# Patient Record
Sex: FEMALE | Race: OTHER | HISPANIC OR LATINO | Employment: UNEMPLOYED | URBAN - METROPOLITAN AREA
[De-identification: names, ages, dates, MRNs, and addresses within clinical notes are randomized per-mention and may not be internally consistent; named-entity substitution may affect disease eponyms.]

---

## 2018-03-06 ENCOUNTER — OFFICE VISIT (OUTPATIENT)
Dept: URGENT CARE | Facility: MEDICAL CENTER | Age: 23
End: 2018-03-06
Payer: COMMERCIAL

## 2018-03-06 VITALS
DIASTOLIC BLOOD PRESSURE: 68 MMHG | WEIGHT: 147 LBS | OXYGEN SATURATION: 99 % | HEART RATE: 90 BPM | HEIGHT: 61 IN | BODY MASS INDEX: 27.75 KG/M2 | RESPIRATION RATE: 18 BRPM | SYSTOLIC BLOOD PRESSURE: 106 MMHG | TEMPERATURE: 97.8 F

## 2018-03-06 DIAGNOSIS — Z02.4 DRIVER'S PERMIT PE (PHYSICAL EXAMINATION): Primary | ICD-10-CM

## 2018-03-06 RX ORDER — ALBUTEROL SULFATE 90 UG/1
2 AEROSOL, METERED RESPIRATORY (INHALATION) EVERY 6 HOURS PRN
COMMUNITY

## 2018-03-06 NOTE — PROGRESS NOTES
[facility]        NAME: Carole Cuevas is a 20 y o  female  : 1995    MRN: [id number]  DATE: 2018  TIME: 11:13 AM    Assessment and Plan   's permit PE (physical examination) [Z02 4]  1  's permit PE (physical examination)       Patient Instructions     Patient deemed fit to operate a motor vehicle  Paperwork signed, copied, original given back to patient  Patient Instructions     Follow up with PCP in 3-5 days  Proceed to  ER if symptoms worsen  Chief Complaint     Chief Complaint   Patient presents with    Annual Exam     drivers permit physical         History of Present Illness   Shaina Jaquez presents to the clinic c/o      71-year-old female presents for learner permit physical examination  No acute complaints or concerns at this time  Review of Systems   Review of Systems   Constitutional: Negative  HENT: Negative  Eyes: Negative  Respiratory: Negative  Cardiovascular: Negative  Gastrointestinal: Negative  Current Medications     No long-term prescriptions on file  Current Allergies     Allergies as of 2018    (No Known Allergies)            The following portions of the patient's history were reviewed and updated as appropriate: allergies, current medications, past family history, past medical history, past social history, past surgical history and problem list     Objective   /68 (BP Location: Left arm, Patient Position: Sitting, Cuff Size: Adult)   Pulse 90   Temp 97 8 °F (36 6 °C)   Resp 18   Ht 5' 1" (1 549 m)   Wt 66 7 kg (147 lb)   LMP 2018   SpO2 99%   BMI 27 78 kg/m²        Physical Exam     Physical Exam   Constitutional: She is oriented to person, place, and time  She appears well-developed and well-nourished  HENT:   Head: Normocephalic and atraumatic     Right Ear: External ear normal    Left Ear: External ear normal    Nose: Nose normal    Mouth/Throat: Oropharynx is clear and moist    Eyes: Conjunctivae and EOM are normal  Pupils are equal, round, and reactive to light  Right eye exhibits no discharge  Left eye exhibits no discharge  Neck: Normal range of motion  Neck supple  No tracheal deviation present  Cardiovascular: Normal rate, regular rhythm and normal heart sounds  Exam reveals no gallop and no friction rub  No murmur heard  Pulmonary/Chest: Effort normal and breath sounds normal  No stridor  No respiratory distress  She has no wheezes  She has no rales  She exhibits no tenderness  Abdominal: Soft  Bowel sounds are normal  There is no rebound and no guarding  Musculoskeletal: Normal range of motion  Lymphadenopathy:     She has no cervical adenopathy  Neurological: She is alert and oriented to person, place, and time  Skin: Skin is warm and dry  Psychiatric: She has a normal mood and affect  Her behavior is normal    Nursing note and vitals reviewed

## 2018-03-06 NOTE — PATIENT INSTRUCTIONS
Patient deemed fit to operate a motor vehicle  Paperwork signed, copied, original given back to patient

## 2018-07-24 ENCOUNTER — OFFICE VISIT (OUTPATIENT)
Dept: URGENT CARE | Facility: MEDICAL CENTER | Age: 23
End: 2018-07-24
Payer: COMMERCIAL

## 2018-07-24 VITALS
HEART RATE: 90 BPM | HEIGHT: 61 IN | TEMPERATURE: 98.6 F | RESPIRATION RATE: 18 BRPM | WEIGHT: 160 LBS | DIASTOLIC BLOOD PRESSURE: 68 MMHG | SYSTOLIC BLOOD PRESSURE: 115 MMHG | OXYGEN SATURATION: 98 % | BODY MASS INDEX: 30.21 KG/M2

## 2018-07-24 DIAGNOSIS — M79.10 MYALGIA: Primary | ICD-10-CM

## 2018-07-24 PROCEDURE — G0383 LEV 4 HOSP TYPE B ED VISIT: HCPCS | Performed by: PHYSICIAN ASSISTANT

## 2018-07-24 RX ORDER — CYCLOBENZAPRINE HCL 10 MG
10 TABLET ORAL 3 TIMES DAILY PRN
Qty: 30 TABLET | Refills: 0 | Status: SHIPPED | OUTPATIENT
Start: 2018-07-24 | End: 2019-10-28 | Stop reason: HOSPADM

## 2018-07-24 RX ORDER — NAPROXEN 500 MG/1
500 TABLET ORAL 2 TIMES DAILY WITH MEALS
Qty: 30 TABLET | Refills: 0 | Status: SHIPPED | OUTPATIENT
Start: 2018-07-24 | End: 2019-10-28 | Stop reason: HOSPADM

## 2018-07-24 NOTE — PATIENT INSTRUCTIONS
Musculoskeletal Pain   WHAT YOU NEED TO KNOW:   Muscle pain can be dull, achy, or sharp  You may have pain and tenderness to the touch as well  It can occur anywhere on your body and is often brought on by exercise  Muscle pain may occur from an injury, such as a sprain, tendonitis, or bone fracture  Muscle pain can also be the result of medical conditions, such as polymyositis, fibromyalgia, and connective tissue disorders  DISCHARGE INSTRUCTIONS:   Self care:   · Rest  as directed and avoid activity that causes pain  You may be able to return to normal activity when you can move without pain  Follow directions for rest and activity  You are at risk for injury for 3 weeks after your symptoms go away  · Ice  your painful muscle area to decrease pain and swelling  Use an ice pack, or put ice in a plastic bag and cover it with a towel  Always  put a cloth between the ice and your skin  Apply the ice as often as directed for the first 24 to 48 hours  · Compression  with a splint, brace, or elastic bandage helps decrease pain and swelling  This may be needed for muscle pain in arms or legs  A splint, brace, or bandage will also help protect the painful area when you move around  · Elevate  a painful arm or leg to reduce swelling and pain  Elevate your limb while you are sitting or lying down  Prop a painful leg on pillows to keep it above the level of your heart  Medicines:   · NSAIDs  help decrease swelling and pain or fever  This medicine is available with or without a doctor's order  NSAIDs can cause stomach bleeding or kidney problems in certain people  If you take blood thinner medicine, always ask your healthcare provider if NSAIDs are safe for you  Always read the medicine label and follow directions  · Acetaminophen  is used to decrease pain  It is available without a doctor's order  Ask your healthcare provider how much to take and when to take it  Follow directions   Acetaminophen can cause liver damage if not taken correctly  Do not take more than one medicine that contains acetaminophen unless directed  · Muscle relaxers  help relax your muscles to decrease pain and muscle spasms  · Steroids  may be given to decrease redness, pain, and swelling  · Take your medicine as directed  Contact your healthcare provider if you think your medicine is not helping or if you have side effects  Tell him if you are allergic to any medicine  Keep a list of the medicines, vitamins, and herbs you take  Include the amounts, and when and why you take them  Bring the list or the pill bottles to follow-up visits  Carry your medicine list with you in case of an emergency  Follow up with your healthcare provider as directed: You may need more tests to help healthcare providers find the cause of your muscle pain  You may need physical therapy to learn muscle strengthening exercises  Write down your questions so you remember to ask them during your visits  Contact your healthcare provider if:   · You have a fever  · You have trouble sleeping because of your pain  · Your painful area becomes more tender, red, and warm to the touch  · You have decreased movement of the painful area  · You have questions or concerns about your condition or care  Return to the emergency department if:   · You have increased severe pain when you move the painful muscle area  · You lose feeling in your painful muscle area  · You have new or worse swelling in the painful area  Your skin may feel tight  · You have increased muscle pain or pain that does not improve with treatment  © 2017 [employer] Information is for End User's use only and may not be sold, redistributed or otherwise used for commercial purposes  All illustrations and images included in CareNotes® are the copyrighted property of [employer] , [employer]  or [employer]  The above information is an  only  It is not intended as medical advice for individual conditions or treatments  Talk to your doctor, nurse or pharmacist before following any medical regimen to see if it is safe and effective for you
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
## 2019-10-19 ENCOUNTER — HOSPITAL ENCOUNTER (OUTPATIENT)
Facility: HOSPITAL | Age: 24
Discharge: HOME | End: 2019-10-19
Attending: OBSTETRICS & GYNECOLOGY | Admitting: OBSTETRICS & GYNECOLOGY
Payer: COMMERCIAL

## 2019-10-19 VITALS
TEMPERATURE: 98.6 F | DIASTOLIC BLOOD PRESSURE: 66 MMHG | HEART RATE: 85 BPM | OXYGEN SATURATION: 100 % | RESPIRATION RATE: 16 BRPM | SYSTOLIC BLOOD PRESSURE: 121 MMHG

## 2019-10-19 PROBLEM — Z3A.34 34 WEEKS GESTATION OF PREGNANCY: Status: ACTIVE | Noted: 2019-10-19

## 2019-10-19 PROCEDURE — 99204 OFFICE O/P NEW MOD 45 MIN: CPT

## 2019-10-19 PROCEDURE — NC001 PR NO CHARGE: Performed by: OBSTETRICS & GYNECOLOGY

## 2019-10-19 NOTE — PROGRESS NOTES
Triage Note - OB  Suman Jaquez 21 y o  female MRN: [id number]  Unit/Bed#: L&D 329-01 Encounter: [id number]    Chief Complaint:   Chief Complaint   Patient presents with    Abdominal Cramping     SORAIDA: Estimated Date of Delivery: 11/30/20    HPI: 21 y o  female [id number] at 34w0d presents with c/o pelvic pressure and cramping  Pt receiving care from a [facility], however, plans to transfer care to [place] [address] as she is considered high risk  Pt states she was dated via an US early in pregnancy and has all her prenatal labs up to date  Pt reports hx of 2 PTD at 20 wk and 25wk with demises  She states in her last pregnancy she had a cerclage and was on progesterone but due to her poor outcome she decided to go without this pregnancy, which has been relatively uncomplicated per her report  Pt denies vaginal bleeding or LOF  She reports positive fetal movement  Vitals: Blood pressure 121/66, pulse 85, temperature 98 6 °F (37 °C), temperature source Axillary, resp  rate 16, SpO2 100 %  ,There is no height or weight on file to calculate BMI  Physical Exam  GEN: well developed and well nourished, alert, oriented times 3 and appears comfortable  Abd: soft, non tender and gravid  SVE: 1/0/-4    FHR: 130bpm, moderate variability, positive accels, no decels, reactive  Period where FHT tracing maternal HR while patient sitting up, but then returned to baseline FHR  Sellers: quiet    Labs:   No visits with results within 1 Day(s) from this visit  Latest known visit with results is:   No results found for any previous visit  Lab, Imaging and other studies: I have personally reviewed pertinent reports  A/P: 21 y o  female [id number] at 34w0d not in labor  1)Pelvic cramping/pressure  SVE 1/0/-4  Encouraged conservative management with Tylenol, heating pad, warm showers, increased hydration  2)Plans to transfer care  Receiving prenatal care from [facility], however, wants to transfer to [place] [address] as she plans to deliver here  Information provided on all available [facility]YN's, including [facility] [address], in [place]  Advised to start calling on Monday to be seen as soon as possible  3) Discharge instructions given to patient and pre-term labor precautions reviewed        Pranav Rivera MD  OBGYN, PGY-3  10/19/2019 3:47 PM

## 2019-10-27 ENCOUNTER — HOSPITAL ENCOUNTER (INPATIENT)
Facility: HOSPITAL | Age: 24
LOS: 1 days | Discharge: HOME | DRG: 563 | End: 2019-10-28
Attending: OBSTETRICS & GYNECOLOGY | Admitting: OBSTETRICS & GYNECOLOGY
Payer: COMMERCIAL

## 2019-10-27 DIAGNOSIS — O09.33 NO PRENATAL CARE IN CURRENT PREGNANCY IN THIRD TRIMESTER: Primary | ICD-10-CM

## 2019-10-27 DIAGNOSIS — O09.30 NO PRENATAL CARE IN CURRENT PREGNANCY: ICD-10-CM

## 2019-10-27 PROBLEM — Z78.9 REFUSAL OF BLOOD PRODUCT: Status: ACTIVE | Noted: 2019-10-27

## 2019-10-27 PROBLEM — D64.9 ANEMIA: Status: ACTIVE | Noted: 2019-10-27

## 2019-10-27 PROBLEM — Z3A.35 35 WEEKS GESTATION OF PREGNANCY: Status: ACTIVE | Noted: 2019-10-19

## 2019-10-27 PROBLEM — Z87.51 HISTORY OF PRETERM DELIVERY: Status: ACTIVE | Noted: 2019-10-27

## 2019-10-27 PROBLEM — J45.909 ASTHMA: Status: ACTIVE | Noted: 2019-10-27

## 2019-10-27 PROBLEM — O60.00 PRETERM LABOR: Status: ACTIVE | Noted: 2019-10-27

## 2019-10-27 LAB
ABO GROUP BLD: NORMAL
AMPHETAMINES SERPL QL SCN: NEGATIVE
BACTERIA UR QL AUTO: ABNORMAL /HPF
BARBITURATES UR QL: NEGATIVE
BASOPHILS # BLD AUTO: 0.04 THOUSANDS/ΜL (ref 0–0.1)
BASOPHILS NFR BLD AUTO: 0 % (ref 0–1)
BENZODIAZ UR QL: NEGATIVE
BILIRUB UR QL STRIP: NEGATIVE
BLD GP AB SCN SERPL QL: NEGATIVE
CLARITY UR: ABNORMAL
COCAINE UR QL: NEGATIVE
COLOR UR: YELLOW
EOSINOPHIL # BLD AUTO: 0.2 THOUSAND/ΜL (ref 0–0.61)
EOSINOPHIL NFR BLD AUTO: 1 % (ref 0–6)
ERYTHROCYTE [DISTWIDTH] IN BLOOD BY AUTOMATED COUNT: 13.8 % (ref 11.6–15.1)
GLUCOSE UR STRIP-MCNC: ABNORMAL MG/DL
HBV SURFACE AG SER QL: NORMAL
HCT VFR BLD AUTO: 29.7 % (ref 34.8–46.1)
HGB BLD-MCNC: 9 G/DL (ref 11.5–15.4)
HGB UR QL STRIP.AUTO: ABNORMAL
IMM GRANULOCYTES # BLD AUTO: 0.11 THOUSAND/UL (ref 0–0.2)
IMM GRANULOCYTES NFR BLD AUTO: 1 % (ref 0–2)
KETONES UR STRIP-MCNC: NEGATIVE MG/DL
LEUKOCYTE ESTERASE UR QL STRIP: ABNORMAL
LYMPHOCYTES # BLD AUTO: 2.06 THOUSANDS/ΜL (ref 0.6–4.47)
LYMPHOCYTES NFR BLD AUTO: 14 % (ref 14–44)
MCH RBC QN AUTO: 25.1 PG (ref 26.8–34.3)
MCHC RBC AUTO-ENTMCNC: 30.3 G/DL (ref 31.4–37.4)
MCV RBC AUTO: 83 FL (ref 82–98)
METHADONE UR QL: NEGATIVE
MONOCYTES # BLD AUTO: 0.89 THOUSAND/ΜL (ref 0.17–1.22)
MONOCYTES NFR BLD AUTO: 6 % (ref 4–12)
NEUTROPHILS # BLD AUTO: 11.53 THOUSANDS/ΜL (ref 1.85–7.62)
NEUTS SEG NFR BLD AUTO: 78 % (ref 43–75)
NITRITE UR QL STRIP: NEGATIVE
NON-SQ EPI CELLS URNS QL MICRO: ABNORMAL /HPF
NRBC BLD AUTO-RTO: 0 /100 WBCS
OPIATES UR QL SCN: NEGATIVE
PCP UR QL: NEGATIVE
PH UR STRIP.AUTO: 6.5 [PH]
PLATELET # BLD AUTO: 246 THOUSANDS/UL (ref 149–390)
PMV BLD AUTO: 9.3 FL (ref 8.9–12.7)
PROT UR STRIP-MCNC: >=300 MG/DL
RBC # BLD AUTO: 3.58 MILLION/UL (ref 3.81–5.12)
RBC #/AREA URNS AUTO: ABNORMAL /HPF
RH BLD: POSITIVE
RPR SER QL: NORMAL
RUBV IGG SERPL IA-ACNC: 31 IU/ML
SP GR UR STRIP.AUTO: 1.02 (ref 1–1.03)
SPECIMEN EXPIRATION DATE: NORMAL
THC UR QL: NEGATIVE
UROBILINOGEN UR QL STRIP.AUTO: 1 E.U./DL
WBC # BLD AUTO: 14.83 THOUSAND/UL (ref 4.31–10.16)
WBC #/AREA URNS AUTO: ABNORMAL /HPF

## 2019-10-27 PROCEDURE — NC001 PR NO CHARGE: Performed by: OBSTETRICS & GYNECOLOGY

## 2019-10-27 PROCEDURE — 99214 OFFICE O/P EST MOD 30 MIN: CPT

## 2019-10-27 PROCEDURE — 87086 URINE CULTURE/COLONY COUNT: CPT | Performed by: STUDENT IN AN ORGANIZED HEALTH CARE EDUCATION/TRAINING PROGRAM

## 2019-10-27 PROCEDURE — 76805 OB US >/= 14 WKS SNGL FETUS: CPT | Performed by: OBSTETRICS & GYNECOLOGY

## 2019-10-27 PROCEDURE — 87086 URINE CULTURE/COLONY COUNT: CPT | Performed by: OBSTETRICS & GYNECOLOGY

## 2019-10-27 PROCEDURE — 87591 N.GONORRHOEAE DNA AMP PROB: CPT | Performed by: STUDENT IN AN ORGANIZED HEALTH CARE EDUCATION/TRAINING PROGRAM

## 2019-10-27 PROCEDURE — 99253 IP/OBS CNSLTJ NEW/EST LOW 45: CPT | Performed by: OBSTETRICS & GYNECOLOGY

## 2019-10-27 PROCEDURE — 81001 URINALYSIS AUTO W/SCOPE: CPT | Performed by: OBSTETRICS & GYNECOLOGY

## 2019-10-27 PROCEDURE — 87491 CHLMYD TRACH DNA AMP PROBE: CPT | Performed by: STUDENT IN AN ORGANIZED HEALTH CARE EDUCATION/TRAINING PROGRAM

## 2019-10-27 PROCEDURE — 80307 DRUG TEST PRSMV CHEM ANLYZR: CPT | Performed by: OBSTETRICS & GYNECOLOGY

## 2019-10-27 PROCEDURE — 80081 OBSTETRIC PANEL INC HIV TSTG: CPT | Performed by: OBSTETRICS & GYNECOLOGY

## 2019-10-27 RX ORDER — NIFEDIPINE 10 MG/1
10 CAPSULE ORAL EVERY 6 HOURS
Status: DISCONTINUED | OUTPATIENT
Start: 2019-10-27 | End: 2019-10-27

## 2019-10-27 RX ORDER — BETAMETHASONE SODIUM PHOSPHATE AND BETAMETHASONE ACETATE 3; 3 MG/ML; MG/ML
12 INJECTION, SUSPENSION INTRA-ARTICULAR; INTRALESIONAL; INTRAMUSCULAR; SOFT TISSUE EVERY 24 HOURS
Status: COMPLETED | OUTPATIENT
Start: 2019-10-27 | End: 2019-10-28

## 2019-10-27 RX ORDER — NIFEDIPINE 10 MG/1
30 CAPSULE ORAL ONCE
Status: COMPLETED | OUTPATIENT
Start: 2019-10-27 | End: 2019-10-27

## 2019-10-27 RX ORDER — DIPHENOXYLATE HYDROCHLORIDE AND ATROPINE SULFATE 2.5; .025 MG/1; MG/1
1 TABLET ORAL DAILY
COMMUNITY

## 2019-10-27 RX ORDER — SODIUM CHLORIDE, SODIUM LACTATE, POTASSIUM CHLORIDE, CALCIUM CHLORIDE 600; 310; 30; 20 MG/100ML; MG/100ML; MG/100ML; MG/100ML
125 INJECTION, SOLUTION INTRAVENOUS CONTINUOUS
Status: DISCONTINUED | OUTPATIENT
Start: 2019-10-27 | End: 2019-10-27

## 2019-10-27 RX ORDER — NIFEDIPINE 10 MG/1
10 CAPSULE ORAL EVERY 8 HOURS SCHEDULED
Status: CANCELLED | OUTPATIENT
Start: 2019-10-27

## 2019-10-27 RX ORDER — FERROUS SULFATE 325(65) MG
325 TABLET ORAL 2 TIMES DAILY WITH MEALS
COMMUNITY

## 2019-10-27 RX ADMIN — NIFEDIPINE 30 MG: 10 CAPSULE ORAL at 03:18

## 2019-10-27 RX ADMIN — SODIUM CHLORIDE 2.5 MILLION UNITS: 9 INJECTION, SOLUTION INTRAVENOUS at 06:10

## 2019-10-27 RX ADMIN — SODIUM CHLORIDE 2.5 MILLION UNITS: 9 INJECTION, SOLUTION INTRAVENOUS at 11:14

## 2019-10-27 RX ADMIN — BETAMETHASONE SODIUM PHOSPHATE AND BETAMETHASONE ACETATE 12 MG: 3; 3 INJECTION, SUSPENSION INTRA-ARTICULAR; INTRALESIONAL; INTRAMUSCULAR at 02:56

## 2019-10-27 RX ADMIN — SODIUM CHLORIDE 5 MILLION UNITS: 0.9 INJECTION, SOLUTION INTRAVENOUS at 01:39

## 2019-10-27 RX ADMIN — IRON SUCROSE 200 MG: 20 INJECTION, SOLUTION INTRAVENOUS at 15:23

## 2019-10-27 RX ADMIN — NIFEDIPINE 10 MG: 10 CAPSULE ORAL at 11:27

## 2019-10-27 RX ADMIN — SODIUM CHLORIDE, SODIUM LACTATE, POTASSIUM CHLORIDE, AND CALCIUM CHLORIDE 1000 ML: .6; .31; .03; .02 INJECTION, SOLUTION INTRAVENOUS at 03:02

## 2019-10-27 RX ADMIN — SODIUM CHLORIDE, SODIUM LACTATE, POTASSIUM CHLORIDE, AND CALCIUM CHLORIDE 125 ML/HR: .6; .31; .03; .02 INJECTION, SOLUTION INTRAVENOUS at 01:38

## 2019-10-27 NOTE — ASSESSMENT & PLAN NOTE
Pt declines blood products for Nondenominational reasons  Hgb is <10, though MCV normal  Most likely diagnosis is iron deficiency  Could consider iron studies  I would suggest a low threshold to consider IV iron while inpatient to optimize her hemoglobin approaching delivery

## 2019-10-27 NOTE — ASSESSMENT & PLAN NOTE
Bedside US performed showing EFW at 11th%ile for gestational age by LMP  Limited anatomy normal appearing, but would advise outpatient follow-up of growth and anatomy in 3 weeks  I will ask our office to reach out to schedule her, assuming she is clinically stable for discharge in the future

## 2019-10-27 NOTE — CONSULTS
Inpatient consultation: Maternal-Fetal Medicine    Referring physician:   Susy Schneider MD    Reason for consultation:   Chief Complaint   Patient presents with    Contractions     per pt started around 1937, and 1-2 mins apart  lasting 30-60 seconds     Dear Dr Phu Vásquez,    Thank you for referring patient Kris Ann for Maternal-Fetal Medicine consultation regarding  labor  As you know, Ms Siomara Giles is a 21y o  year-old para [id number] at [address] [place] [address]: 1, admitted with  labor  Her history is as follows:    Past Medical History:   Diagnosis Date    Asthma     GERD (gastroesophageal reflux disease)     Hx of migraines      No past surgical history on file  OB History    Para Term  AB Living   4 3 1 2 0 1   SAB TAB Ectopic Multiple Live Births                  # Outcome Date GA Lbr Davide/2nd Weight Sex Delivery Anes PTL Lv   4 Current            3             2             1 Term                Gynecologic history: No LMP recorded  Patient is pregnant  Social History     Socioeconomic History    Marital status: Single     Spouse name: Not on file    Number of children: Not on file    Years of education: Not on file    Highest education level: Not on file   Occupational History    Not on file   Social Needs    Financial resource strain: Not on file    Food insecurity:     Worry: Not on file     Inability: Not on file    Transportation needs:     Medical: Not on file     Non-medical: Not on file   Tobacco Use    Smoking status: Never Smoker    Smokeless tobacco: Never Used   Substance and Sexual Activity    Alcohol use:  Yes    Drug use: No    Sexual activity: Not on file   Lifestyle    Physical activity:     Days per week: Not on file     Minutes per session: Not on file    Stress: Not on file   Relationships    Social connections:     Talks on phone: Not on file     Gets together: Not on file     Attends Judaism service: Not on file     Active member of club or organization: Not on file     Attends meetings of clubs or organizations: Not on file     Relationship status: Not on file    Intimate partner violence:     Fear of current or ex partner: Not on file     Emotionally abused: Not on file     Physically abused: Not on file     Forced sexual activity: Not on file   Other Topics Concern    Not on file   Social History Narrative    Not on file     No family history on file  Current Facility-Administered Medications:     betamethasone acetate-betamethasone sodium phosphate (CELESTONE) injection 12 mg, 12 mg, Intramuscular, Q24H, Kaushik Abreu MD, 12 mg at 10/27/19 0256    iron sucrose (VENOFER) 200 mg in sodium chloride 0 9 % 100 mL IVPB, 200 mg, Intravenous, Once, Danny Collier MD    [COMPLETED] NIFEdipine (PROCARDIA) capsule 30 mg, 30 mg, Oral, Once, 30 mg at 10/27/19 0318 **FOLLOWED BY** NIFEdipine (PROCARDIA) capsule 10 mg, 10 mg, Oral, Q6H, Kaushik Abreu MD, 10 mg at 10/27/19 1127    No Known Allergies      Pertinent items are noted in HPI  Examination:  Patient Vitals for the past 24 hrs:   BP Temp Temp src Pulse Resp SpO2   10/27/19 1208 -- 99 3 °F (37 4 °C) Oral -- -- --   10/27/19 1127 111/68 -- -- -- -- --   10/27/19 1125 111/68 -- -- 96 -- --   10/27/19 1111 98/57 -- -- 97 -- --   10/27/19 1108 101/57 -- -- 100 -- --   10/27/19 0745 119/65 98 °F (36 7 °C) -- 85 18 --   10/27/19 0318 116/77 98 2 °F (36 8 °C) Oral 101 20 --   10/27/19 0300 103/59 -- -- 104 -- --   10/27/19 0256 106/62 -- -- 104 -- --   10/27/19 0100 113/67 97 9 °F (36 6 °C) Oral -- 19 --   10/27/19 0014 112/65 -- -- (!) 115 20 98 %     General appearance: alert, well appearing, and in no distress  Pulmonary exam: Non-labored  Abdominal exam: Soft, gravid, nontender     Extremity exam:no pedal edema noted     Relevant lab data:    Results from last 7 days   Lab Units 10/27/19  0117   WBC Thousand/uL 14 83*   NEUTROS ABS Thousands/µL 11 53*   HEMOGLOBIN g/dL 9 0*   MCV fL 83   PLATELETS Thousands/uL 246     Results from last 7 days   Lab Units 10/27/19  0117   NEUTROS PCT % 78*   MONOS PCT % 6   EOS PCT % 1             Invalid input(s): GLU, CA        Results from last 7 days   Lab Units 10/27/19  0117   PLATELETS Thousands/uL 246       Fetal data:      MEDS:   Medication Administration - last 24 hours from 10/26/2019 1417 to 10/27/2019 1417       Date/Time Order Dose Route Action Action by     10/27/2019 0139 penicillin G (PFIZERPEN-G) in 0 9% sodium chloride 250 mL IVPB 5 Million Units 5 Million Units Intravenous [address] [facility], NESTOR     10/27/2019 1114 penicillin G (PFIZERPEN-G) in 0 9% sodium chloride 100 mL IVPB 2 5 Million Units 2 5 Million Units Intravenous [address] [facility], NESTOR     10/27/2019 7277 penicillin G (PFIZERPEN-G) in 0 9% sodium chloride 100 mL IVPB 2 5 Million Units 2 5 Million Units Intravenous [employer], RN     10/27/2019 0302 lactated ringers bolus 1,000 mL 1,000 mL Intravenous [address] [facility] RN     10/27/2019 0138 lactated ringers infusion 125 mL/hr Intravenous [address] [facility] NESTOR     10/27/2019 0318 NIFEdipine (PROCARDIA) capsule 30 mg 30 mg Oral Given Devin Coffey, NESTOR     10/27/2019 1127 NIFEdipine (PROCARDIA) capsule 10 mg 10 mg Oral Given Nury Pip, RN     10/27/2019 1106 NIFEdipine (PROCARDIA) capsule 10 mg 10 mg Oral Not Given Nury Pipe, RN     10/27/2019 0256 betamethasone acetate-betamethasone sodium phosphate (CELESTONE) injection 12 mg 12 mg Intramuscular Given Devin Coffey RN        Current Facility-Administered Medications   Medication Dose Route Frequency    betamethasone acetate-betamethasone sodium phosphate (CELESTONE) injection 12 mg  12 mg Intramuscular Q24H    iron sucrose (VENOFER) 200 mg in sodium chloride 0 9 % 100 mL IVPB  200 mg Intravenous Once    NIFEdipine (PROCARDIA) capsule 10 mg  10 mg Oral Q6H     Invasive Devices     Peripheral Intravenous Line Peripheral IV 10/27/19 Left Antecubital less than 1 day              Assessment and Plan: Ms Maynor Navas is a 21y o  year-old para [id number] here for  labor  By issue:     labor  Pt presently receiving procardia tocolysis, betamethasone course, IV fluids an penicillin for unknown GBS and prematurity  Agree with present management  She is at high risk for recurrent  birth given history  35 weeks gestation of pregnancy  Bedside US performed showing EFW at 11th%ile for gestational age by LMP  Limited anatomy normal appearing, but would advise outpatient follow-up of growth and anatomy in 3 weeks  I will ask our office to reach out to schedule her, assuming she is clinically stable for discharge in the future  Refusal of blood product  Pt declines blood products for Mosque reasons  Hgb is <10, though MCV normal  Most likely diagnosis is iron deficiency  Could consider iron studies  I would suggest a low threshold to consider IV iron while inpatient to optimize her hemoglobin approaching delivery  A total of 60 minutes were spent in this encounter with >50% of the time spent in face-to-face counseling and in coordination of care  The majority of time was spent performing prenatal ultrasound  At the conclusion of today's encounter, all questions were answered to her satisfaction  Thank you very much for this kind referral and please do not hesitate to contact me with any further questions or concerns      Sincerely,    Chris Martinez MD  Attending Physician, Jose Roberto

## 2019-10-27 NOTE — H&P
H&P Exam - Obstetrics   Sheilalashanda Huang [facility] 21 y o  female MRN: [id number]  Unit/Bed#: L&D 326-01 Encounter: [id number]      History of Present Illness     Chief Complaint: Contractions    HPI:  Floresita Vigil is a 21 y o  [id number] at unknown gestational age, who is being admitted for contractions  Patient reports that her LMP was 19  Patient complaining of very painful contractions, and visibly very uncomfortable  She reports that she had two fetal demises after 20 weeks  She states that she has not had prenatal care at all during this pregnancy, and denies having any ultrasounds during the pregnancy  Patient reports that her babies do better without any intervention"  Her most recent child was given up for adoption  Contractions:  Yes  Loss of fluid:  No  Vaginal bleeding:  No  Fetal movement:  Yes    She is SWOB patient  PREGNANCY COMPLICATIONS:   1) history of 2 prior alli viable demises  2) no prenatal care  3)     OB History    Para Term  AB Living   4 3 1 2 0 1   SAB TAB Ectopic Multiple Live Births                  # Outcome Date GA Lbr Davide/2nd Weight Sex Delivery Anes PTL Lv   4 Current            3             2             1 Term                Baby complications/comments: Unknown gestational age    Review of Systems   Constitutional: Negative for diaphoresis and fever  Respiratory: Negative for apnea, shortness of breath and wheezing  Cardiovascular: Negative for chest pain and palpitations  Gastrointestinal: Negative for blood in stool and vomiting  Genitourinary: Negative for dysuria and hematuria  Neurological: Negative for dizziness and numbness  Historical Information   Past Medical History:   Diagnosis Date    Asthma     GERD (gastroesophageal reflux disease)     Hx of migraines      No past surgical history on file    Social History   Social History     Substance and Sexual Activity   Alcohol Use Yes     Social History Substance and Sexual Activity   Drug Use No     Social History     Tobacco Use   Smoking Status Never Smoker   Smokeless Tobacco Never Used     Family History: non-contributory    Meds/Allergies      Medications Prior to Admission   Medication    albuterol (PROVENTIL HFA,VENTOLIN HFA) 90 mcg/act inhaler    Ascorbic Acid, Vitamin C, (VITAMIN C) 100 MG tablet    ferrous sulfate 325 (65 Fe) mg tablet    fluticasone-salmeterol (ADVAIR HFA) 45-21 MCG/ACT inhaler    multivitamin (THERAGRAN) TABS    cyclobenzaprine (FLEXERIL) 10 mg tablet    naproxen (NAPROSYN) 500 mg tablet      No Known Allergies    OBJECTIVE:    Vitals: Blood pressure 116/77, pulse 101, temperature 98 2 °F (36 8 °C), temperature source Oral, resp  rate 20, SpO2 98 %  There is no height or weight on file to calculate BMI  Physical Exam   Constitutional: She appears well-nourished  HENT:   Head: Normocephalic and atraumatic  Cardiovascular: Normal rate, regular rhythm and normal heart sounds  Pulmonary/Chest: Breath sounds normal  No respiratory distress  She has no wheezes  Abdominal: Bowel sounds are normal  There is no tenderness         [place]: Deferred  Nitrazine: Deferred    Cervix:  Dilation: 3  Effacement (%): 50  Station: -3    Fetal heart rate:   Baseline Rate: 145 bpm  Variability: Moderate 6-25 bpm  Accelerations: 15 x 15 or greater     [place]:   Contraction Frequency (minutes): n/a(per pt every 2-5)  Contraction Duration (seconds): n/a(per pt 30-40 seconds)  Contraction Quality: Mild    GBS: Unknown    Bedside biometry: Consistent with 33w6d    Prenatal Labs:   Blood Type:   Lab Results   Component Value Date/Time    ABO Grouping A 10/27/2019 01:17 AM     , D (Rh type):   Lab Results   Component Value Date/Time    Rh Factor Positive 10/27/2019 01:17 AM     HCT/HGB:   Lab Results   Component Value Date/Time    Hematocrit 29 7 (L) 10/27/2019 01:17 AM    Hemoglobin 9 0 (L) 10/27/2019 01:17 AM      , MCV:   Lab Results Component Value Date/Time    MCV 83 10/27/2019 01:17 AM      , Platelets:   Lab Results   Component Value Date/Time    Platelets 676  01:17 AM    urine Drug Screen:   Lab Results   Component Value Date/Time    Barbiturate Ur Negative 10/27/2019 01:59 AM    Benzodiazepine Urine Negative 10/27/2019 01:59 AM    THC Urine Negative 10/27/2019 01:59 AM    Cocaine Urine Negative 10/27/2019 01:59 AM    Methadone Urine Negative 10/27/2019 01:59 AM    Opiate Urine Negative 10/27/2019 01:59 AM    PCP Ur Negative 10/27/2019 01:59 AM       Invasive Devices     Peripheral Intravenous Line            Peripheral IV 10/27/19 Left Antecubital less than 1 day                  Assessment/Plan     ASSESSMENT:  23yo  at 33w6d gestation by third trimester biometry scan who is being admitted for concern for  labor  PLAN:   - Admit   - Prenatal panel, UDS   -  Start with expectant management   - GBS unknown status:  PCN for prophylaxis    - betamethasone for fetal lung maturity   - Procardia for tocolytics   - Discussed with Dr Brittney Arriola      This patient will be an OBSERVATION and I certify the anticipated length of stay is <2 Midnights      Alize Ballard MD  10/27/2019  7:41 AM

## 2019-10-27 NOTE — ASSESSMENT & PLAN NOTE
Pt presently receiving procardia tocolysis, betamethasone course, IV fluids an penicillin for unknown GBS and prematurity  Agree with present management  She is at high risk for recurrent  birth given history

## 2019-10-28 ENCOUNTER — TELEPHONE (OUTPATIENT)
Dept: PERINATAL CARE | Facility: CLINIC | Age: 24
End: 2019-10-28

## 2019-10-28 ENCOUNTER — TELEPHONE (OUTPATIENT)
Dept: PERINATAL CARE | Facility: OTHER | Age: 24
End: 2019-10-28

## 2019-10-28 VITALS
OXYGEN SATURATION: 98 % | RESPIRATION RATE: 18 BRPM | SYSTOLIC BLOOD PRESSURE: 103 MMHG | DIASTOLIC BLOOD PRESSURE: 64 MMHG | HEART RATE: 92 BPM | TEMPERATURE: 98.7 F

## 2019-10-28 LAB
BACTERIA UR CULT: NORMAL
BACTERIA UR CULT: NORMAL
C TRACH DNA SPEC QL NAA+PROBE: NEGATIVE
N GONORRHOEA DNA SPEC QL NAA+PROBE: NEGATIVE

## 2019-10-28 PROCEDURE — 99232 SBSQ HOSP IP/OBS MODERATE 35: CPT | Performed by: OBSTETRICS & GYNECOLOGY

## 2019-10-28 PROCEDURE — 59025 FETAL NON-STRESS TEST: CPT | Performed by: OBSTETRICS & GYNECOLOGY

## 2019-10-28 RX ADMIN — BETAMETHASONE SODIUM PHOSPHATE AND BETAMETHASONE ACETATE 12 MG: 3; 3 INJECTION, SUSPENSION INTRA-ARTICULAR; INTRALESIONAL; INTRAMUSCULAR at 02:23

## 2019-10-28 NOTE — UTILIZATION REVIEW
Initial Clinical Review    Admission: Date/Time/Statement: Inpatient Admission Orders (From admission, onward)     Ordered        10/27/19 0214  Inpatient Admission  Once                   Orders Placed This Encounter   Procedures    Inpatient Admission     Standing Status:   Standing     Number of Occurrences:   1     Order Specific Question:   Admitting Physician     Answer:   Lady Vitale [[id number]]     Order Specific Question:   Level of Care     Answer:   Med Surg [[id number]]     Order Specific Question:   Estimated length of stay     Answer:   More than 2 Midnights     Order Specific Question:   Certification     Answer:   I certify that inpatient services are medically necessary for this patient for a duration of greater than two midnights  See H&P and MD Progress Notes for additional information about the patient's course of treatment  ED Arrival Information     Expected Arrival Acuity Means of Arrival Escorted By Service Admission Type    10/27/2019  10/27/2019 00:08 - Walk-In Self OB/GYN Elective    Arrival Complaint    labor, no call; 35 wks        Chief Complaint   Patient presents with    Contractions     per pt started around 1937, and 1-2 mins apart  lasting 30-60 seconds     Assessment/Plan: 21 y o  [id number] at unknown gestational age, presented to ED with contractions and was admitted to IP with concern for  Labor @ 33w6d  Plan management with tocolytics, betamethasone, and penicillin  Patient reports that her LMP was 19  Patient complaining of very painful contractions, and visibly very uncomfortable  She reports that she had two fetal demises after 20 weeks  She states that she has not had prenatal care at all during this pregnancy, and denies having any ultrasounds during the pregnancy    Cervix:  Dilation: 3   Effacement (%): 50   Station: -3  Fetal heart rate:  Baseline Rate: 145 bpm   Variability: Moderate 6-25 bpm   Accelerations: 15 x 15 or greater   [place]: Contraction Frequency (minutes): n/a(per pt every 2-5)  Contraction Duration (seconds): n/a(per pt 30-40 seconds)   Contraction Quality: Mild  GBS: Unknown  Bedside biometry: Consistent with 33w6d    Per MFM: Hi Risk for recurrent  birth given history  Hg < 10  PT declines blood products for [place] reasons  Most likely diagnosis is iron deficiency  Low threshold to consider IV iron while inpatient to optimize her hemoglobin approaching delivery       10/28   Written for Discharge      ED Triage Vitals   Temperature Pulse Respirations Blood Pressure SpO2   10/27/19 0100 10/27/19 0014 10/27/19 0014 10/27/19 0014 10/27/19 0014   97 9 °F (36 6 °C) (!) 115 20 112/65 98 %      Temp Source Heart Rate Source Patient Position - Orthostatic VS BP Location FiO2 (%)   10/27/19 0100 10/27/19 0100 -- 10/27/19 1610 --   Oral Monitor  Left arm       Pain Score       10/27/19 0745       No Pain        Wt Readings from Last 1 Encounters:   18 72 6 kg (160 lb)     Additional Vital Signs:     10/28/19 0741  98 7 °F (37 1 °C) 92 18 103/64 --   10/27/19 2034  98 4 °F (36 9 °C) 88 18 118/74 --   10/27/19 1944  -- 104 -- 81/48 --   10/27/19 1732  -- 106 -- 83/52  --   OBSERV: Dr Heidi Henry aware of blood pressure 83/52  Patient asymptomatic  No new orders received from Dr Heidi Henry at this time   at 10/27/19 1732   10/27/19 1610  97 9 °F (36 6 °C) 117 16 103/56 --   10/27/19 0745  98 °F (36 7 °C) 85 18 119/65 --       Pertinent Labs/Diagnostic Test Results:   Results from last 7 days   Lab Units 10/27/19  011   WBC Thousand/uL 14 83*   HEMOGLOBIN g/dL 9 0*   HEMATOCRIT % 29 7*   PLATELETS Thousands/uL 246   NEUTROS ABS Thousands/µL 11 53*     Results from last 7 days   Lab Units 10/27/19  0117   HEP B S AG  Non-reactive     Results from last 7 days   Lab Units 10/27/19  0159   CLARITY UA  Cloudy   COLOR UA  Yellow   SPEC GRAV UA  1 025   PH UA  6 5   GLUCOSE UA mg/dl 100 (1/10%)*   KETONES UA mg/dl Negative   BLOOD UA  Large* PROTEIN UA mg/dl >=300*   NITRITE UA  Negative   BILIRUBIN UA  Negative   UROBILINOGEN UA E U /dl 1 0   LEUKOCYTES UA  Small*   WBC UA /hpf Innumerable*   RBC UA /hpf Field obscured, unable to enumerate*   BACTERIA UA /hpf Field obscured, unable to enumerate*   EPITHELIAL CELLS WET PREP /hpf None Seen     Results from last 7 days   Lab Units 10/27/19  0159   AMPH/METH  Negative   BARBITURATE UR  Negative   BENZODIAZEPINE UR  Negative   COCAINE UR  Negative   METHADONE URINE  Negative   OPIATE UR  Negative   PCP UR  Negative   THC UR  Negative     Results from last 7 days   Lab Units 10/27/19  0159   URINE CULTURE  80,000-89,000 cfu/ml        Meds  Given in  L&D  Medication Administration from 10/27/2019 0008 to 10/28/2019 1151       Date/Time Order Dose Route Action     10/27/2019 0139 penicillin G (PFIZERPEN-G) in 0 9% sodium chloride 250 mL IVPB 5 Million Units 5 Million Units Intravenous New Bag     10/27/2019 1114 penicillin G (PFIZERPEN-G) in 0 9% sodium chloride 100 mL IVPB 2 5 Million Units 2 5 Million Units Intravenous New Bag     10/27/2019 0610 penicillin G (PFIZERPEN-G) in 0 9% sodium chloride 100 mL IVPB 2 5 Million Units 2 5 Million Units Intravenous New Bag     10/27/2019 0302 lactated ringers bolus 1,000 mL 1,000 mL Intravenous New Bag     10/27/2019 0138 lactated ringers infusion 125 mL/hr Intravenous New Bag     10/27/2019 0318 NIFEdipine (PROCARDIA) capsule 30 mg 30 mg Oral Given     10/27/2019 1127 NIFEdipine (PROCARDIA) capsule 10 mg 10 mg Oral Given     10/28/2019 0223 betamethasone acetate-betamethasone sodium phosphate (CELESTONE) injection 12 mg 12 mg Intramuscular Given     10/27/2019 0256 betamethasone acetate-betamethasone sodium phosphate (CELESTONE) injection 12 mg 12 mg Intramuscular Given     10/27/2019 1523 iron sucrose (VENOFER) 200 mg in sodium chloride 0 9 % 100 mL IVPB 200 mg Intravenous New Bag        Past Medical History:   Diagnosis Date    Asthma     GERD (gastroesophageal reflux disease)     Hx of migraines      Present on Admission:  **None**      Admitting Diagnosis: Abdominal pain affecting pregnancy [O26 899, R10 9]  Age/Sex: 21 y o  female     Admission Orders:  See Meds listed above  Fetal NST  IP CONSULT TO PERINATOLOGY    Network Utilization Review Department  [email or web address]  ATTENTION: Please call with any questions or concerns to [phone] and carefully listen to the prompts so that you are directed to the right person  All voicemails are confidential   Amy Saenz all requests for admission clinical reviews, approved or denied determinations and any other requests to dedicated fax number below belonging to the campus where the patient is receiving treatment    FACILITY NAME UR FAX NUMBER   ADMISSION DENIALS (Administrative/Medical Necessity) [address] [facility] (Maternity/NICU/Pediatrics) [phone]   [facility] [address] [place] [address] [address] [facility] [phone]   [address] [place] [address] [address] [facility] [phone]   Katina Socks [address] [phone]

## 2019-10-28 NOTE — PROGRESS NOTES
Progress Note - [facility]  Noelle Jaquez 21 y o  female MRN: [id number]  Unit/Bed#: L&D 327-01 Encounter: [id number]      Shaina Jaquez has cramping and low pelvic pain  She is better then came when compare to admission    Reports contraction mild; has  no LOF, and no VB  Good FM  /74   Pulse 88   Temp 98 4 °F (36 9 °C) (Oral)   Resp 18   SpO2 98%     Physical Exam:   General: AAOx3  No acute distress  Abdominal: Soft, non-tender gravid uterus  Extremities: No TTP  Lower limbs symmetric bilaterally  FHT:  Baseline Rate: 140 bpm  Variability: Moderate 6-25 bpm  Accelerations: 15 x 15 or greater  Decelerations: None  TOCO:   Contraction Frequency (minutes): irritability  Contraction Duration (seconds): 30-90  Contraction Quality: Mild    Lab Results   Component Value Date    WBC 14 83 (H) 10/27/2019    HGB 9 0 (L) 10/27/2019    HCT 29 7 (L) 10/27/2019     10/27/2019     No results found for: NA, K, CL, CO2, BUN, CREATININE, GLUCOSE, AST, ALT    A/P: Shaina Jaquez is a 21 y o  [id number] at 35w2d admitted with contractions  Currently in stable condition  Hospital Day: 2   1) Pt received BTM, PCN and procardia  2) Patient dating confirmed with [facility] ultrasound and procardia stopped    3) If patient feling well in term of contractions with recheck, we may discharge her with [facility] follow up    4) DVT PPx: SCDs  5) Encouraged ambulation as tolerated     Annette Martinez MD  [id number]  7:22 AM

## 2019-10-28 NOTE — TELEPHONE ENCOUNTER
L/m for pt to call and return phone call  We have to see her 3wks from now and she wants [facility] heart office  I l/m offereing 830 or 3 pm at [facility] on 11/20

## 2019-10-29 LAB — HIV 1+2 AB+HIV1 P24 AG SERPL QL IA: NORMAL

## 2024-08-22 NOTE — DISCHARGE INSTRUCTIONS
Go to the lab for repeat urinalysis and 1 hour Glucola, follow up with [facility] within a week  Pregnancy at 31 to 34 [address]:   You may continue to have symptoms such as shortness of breath, heartburn, contractions, or swelling of your ankles and feet  You may be gaining about 1 pound a week now  DISCHARGE INSTRUCTIONS:   Seek care immediately if:   · You develop a severe headache that does not go away  · You have new or increased vision changes, such as blurred or spotted vision  · You have new or increased swelling in your face or hands  · You have vaginal spotting or bleeding  · Your water broke or you feel warm water gushing or trickling from your vagina  Contact your healthcare provider if:   · You have more than 5 contractions in 1 hour  · You notice any changes in your baby's movements  · You have abdominal cramps, pressure, or tightening  · You have a change in vaginal discharge  · You have chills or a fever  · You have vaginal itching, burning, or pain  · You have yellow, green, white, or foul-smelling vaginal discharge  · You have pain or burning when you urinate, less urine than usual, or pink or bloody urine  · You have questions or concerns about your condition or care  How to care for yourself at this stage of your pregnancy:   · Eat a variety of healthy foods  Healthy foods include fruits, vegetables, whole-grain breads, low-fat dairy foods, beans, lean meats, and fish  Drink liquids as directed  Ask how much liquid to drink each day and which liquids are best for you  Limit caffeine to less than 200 milligrams each day  Limit your intake of fish to 2 servings each week  Choose fish low in mercury such as canned light tuna, shrimp, salmon, cod, or tilapia  Do not  eat fish high in mercury such as swordfish, tilefish, husam mackerel, and shark       · Manage heartburn  by eating 4 or 5 small meals each day instead of large meals  Avoid spicy food  · Manage swelling  by lying down and putting your feet up  · Take prenatal vitamins as directed  Your need for certain vitamins and minerals, such as folic acid, increases during pregnancy  Prenatal vitamins provide some of the extra vitamins and minerals you need  Prenatal vitamins may also help to decrease the risk of certain birth defects  · Talk to your healthcare provider about exercise  Moderate exercise can help you stay fit  Your healthcare provider will help you plan an exercise program that is safe for you during pregnancy  · Do not smoke  If you smoke, it is never too late to quit  Smoking increases your risk of a miscarriage and other health problems during your pregnancy  Smoking can cause your baby to be born too early or weigh less at birth  Ask your healthcare provider for information if you need help quitting  · Do not drink alcohol  Alcohol passes from your body to your baby through the placenta  It can affect your baby's brain development and cause fetal alcohol syndrome (FAS)  FAS is a group of conditions that causes mental, behavior, and growth problems  · Talk to your healthcare provider before you take any medicines  Many medicines may harm your baby if you take them when you are pregnant  Do not take any medicines, vitamins, herbs, or supplements without first talking to your healthcare provider  Never use illegal or street drugs (such as marijuana or cocaine) while you are pregnant  Safety tips during pregnancy:   · Avoid hot tubs and saunas  Do not use a hot tub or sauna while you are pregnant, especially during your first trimester  Hot tubs and saunas may raise your baby's temperature and increase the risk of birth defects  · Avoid toxoplasmosis  This is an infection caused by eating raw meat or being around infected cat feces  It can cause birth defects, miscarriages, and other problems   Wash your hands after you touch raw meat  Make sure any meat is well-cooked before you eat it  Avoid raw eggs and unpasteurized milk  Use gloves or ask someone else to clean your cat's litter box while you are pregnant  Changes that are happening with your baby:  By 34 weeks, your baby may weigh more than 5 pounds  Your baby will be about 12 ½ inches long from the top of the head to the rump (baby's bottom)  Your baby is gaining about ½ pound a week  Your baby's eyes open and close now  Your baby's kicks and movements are more forceful at this time  What you need to know about prenatal care: Your healthcare provider will check your blood pressure and weight  You may also need the following:  · A urine test  may also be done to check for sugar and protein  These can be signs of gestational diabetes or infection  Protein in your urine may also be a sign of preeclampsia  Preeclampsia is a condition that can develop during week 20 or later of your pregnancy  It causes high blood pressure, and it can cause problems with your kidneys and other organs  · A Tdap vaccine  may be recommended by your healthcare provider  · Fundal height  is a measurement of your uterus to check your baby's growth  This number is usually the same as the number of weeks that you have been pregnant  Your healthcare provider may also check your baby's position  · Your baby's heart rate  will be checked  © 2017 [employer] Information is for End User's use only and may not be sold, redistributed or otherwise used for commercial purposes  All illustrations and images included in CareNotes® are the copyrighted property of A D A M [employer] Inc  or Morgan [employer]  The above information is an  only  It is not intended as medical advice for individual conditions or treatments  Talk to your doctor, nurse or pharmacist before following any medical regimen to see if it is safe and effective for you  English

## 2024-09-25 ENCOUNTER — APPOINTMENT (OUTPATIENT)
Dept: URGENT CARE | Age: 29
End: 2024-09-25

## 2024-10-25 ENCOUNTER — APPOINTMENT (OUTPATIENT)
Dept: URGENT CARE | Age: 29
End: 2024-10-25

## 2025-07-16 ENCOUNTER — APPOINTMENT (OUTPATIENT)
Dept: URGENT CARE | Age: 30
End: 2025-07-16